# Patient Record
Sex: FEMALE | Race: WHITE | NOT HISPANIC OR LATINO | Employment: FULL TIME | URBAN - METROPOLITAN AREA
[De-identification: names, ages, dates, MRNs, and addresses within clinical notes are randomized per-mention and may not be internally consistent; named-entity substitution may affect disease eponyms.]

---

## 2020-06-23 ENCOUNTER — OFFICE VISIT (OUTPATIENT)
Dept: URGENT CARE | Facility: CLINIC | Age: 70
End: 2020-06-23
Payer: COMMERCIAL

## 2020-06-23 VITALS
DIASTOLIC BLOOD PRESSURE: 72 MMHG | TEMPERATURE: 98.4 F | BODY MASS INDEX: 31.34 KG/M2 | WEIGHT: 166 LBS | RESPIRATION RATE: 20 BRPM | HEART RATE: 77 BPM | SYSTOLIC BLOOD PRESSURE: 158 MMHG | HEIGHT: 61 IN

## 2020-06-23 DIAGNOSIS — H61.23 HEARING LOSS DUE TO CERUMEN IMPACTION, BILATERAL: Primary | ICD-10-CM

## 2020-06-23 PROCEDURE — 99213 OFFICE O/P EST LOW 20 MIN: CPT | Performed by: PHYSICIAN ASSISTANT

## 2020-06-23 PROCEDURE — 69210 REMOVE IMPACTED EAR WAX UNI: CPT | Performed by: PHYSICIAN ASSISTANT

## 2021-03-30 DIAGNOSIS — Z23 ENCOUNTER FOR IMMUNIZATION: ICD-10-CM

## 2022-12-17 ENCOUNTER — OFFICE VISIT (OUTPATIENT)
Dept: URGENT CARE | Facility: CLINIC | Age: 72
End: 2022-12-17

## 2022-12-17 VITALS
DIASTOLIC BLOOD PRESSURE: 90 MMHG | SYSTOLIC BLOOD PRESSURE: 163 MMHG | TEMPERATURE: 98.2 F | OXYGEN SATURATION: 97 % | RESPIRATION RATE: 18 BRPM | HEART RATE: 92 BPM

## 2022-12-17 DIAGNOSIS — H61.23 BILATERAL IMPACTED CERUMEN: Primary | ICD-10-CM

## 2022-12-17 NOTE — PROGRESS NOTES
[facility] Now        NAME: Carlos Neville is a 81 y o  female  : 1950    MRN: [id number]  DATE: 2022  TIME: 1:54 PM    Assessment and Plan   Bilateral impacted cerumen [H61 23]  1  Bilateral impacted cerumen              Patient Instructions     --Dry ears as instructed  --Seek re-evaluation for any recurrent symptoms  Chief Complaint     Chief Complaint   Patient presents with   • Earache     Pt reports bilateral ear pain due to excessive wax, states decreased hearing  History of Present Illness       Here with complaints of both ears with discomfort, feeling blocked, decreased hearing x couple days  Feels like wax in ears  Has to get flushed every 6 months and she is about due  Some nasal congestion x 1-2 weeks which she attributes to pet allergies  No fever, headache, sore throat  No otorrhea  No Q-tip use or OTC ear drops  Review of Systems   Review of Systems   Constitutional: Negative for fever  HENT: Positive for ear pain, hearing loss and rhinorrhea  Negative for ear discharge  Respiratory: Negative for cough  Neurological: Negative for headaches  Current Medications     No current outpatient medications on file  Current Allergies     Allergies as of 2022   • (No Known Allergies)            The following portions of the patient's history were reviewed and updated as appropriate: allergies, current medications, past family history, past medical history, past social history, past surgical history and problem list      No past medical history on file  No past surgical history on file  No family history on file  Medications have been verified  Objective   /90   Pulse 92   Temp 98 2 °F (36 8 °C)   Resp 18   SpO2 97%   No LMP recorded  Physical Exam     Physical Exam  HENT:      Right Ear: There is impacted cerumen  Left Ear: There is impacted cerumen  Ears:      Comments:  Both ears 100% occluded with cerumen  S/p removal after 15 minutes of warm water lavage, canals clear/WNL, TM's dull grey without bulge  Pulmonary:      Effort: Pulmonary effort is normal    Neurological:      Mental Status: She is alert  Psychiatric:         Mood and Affect: Mood normal            Ear cerumen removal    Date/Time: 12/17/2022 1:58 PM  Performed by: BO Lanier  Authorized by: BO Lanier   Universal Protocol:  Consent: Verbal consent obtained  Risks and benefits: risks, benefits and alternatives were discussed  Consent given by: patient  Patient understanding: patient states understanding of the procedure being performed  Patient consent: the patient's understanding of the procedure matches consent given  Procedure consent: procedure consent matches procedure scheduled  Patient identity confirmed: verbally with patient      Patient location:  Clinic  Indications / Diagnosis:  Discomfort, decreased hearing  Procedure details:     Location:  L ear and R ear    Procedure type: irrigation only      Approach:  Natural orifice    Visualization (free text):  SEE EXAM  Post-procedure details:     Complication:  None    Hearing quality:  Normal    Patient tolerance of procedure:   Tolerated well, no immediate complications